# Patient Record
Sex: MALE | Race: WHITE | URBAN - METROPOLITAN AREA
[De-identification: names, ages, dates, MRNs, and addresses within clinical notes are randomized per-mention and may not be internally consistent; named-entity substitution may affect disease eponyms.]

---

## 2021-06-30 ENCOUNTER — EMERGENCY (EMERGENCY)
Facility: HOSPITAL | Age: 36
LOS: 1 days | Discharge: HOME | End: 2021-06-30
Attending: EMERGENCY MEDICINE | Admitting: EMERGENCY MEDICINE
Payer: MEDICAID

## 2021-06-30 VITALS
RESPIRATION RATE: 16 BRPM | HEART RATE: 96 BPM | TEMPERATURE: 98 F | SYSTOLIC BLOOD PRESSURE: 133 MMHG | DIASTOLIC BLOOD PRESSURE: 84 MMHG | HEIGHT: 74 IN | OXYGEN SATURATION: 97 % | WEIGHT: 192.02 LBS

## 2021-06-30 VITALS
OXYGEN SATURATION: 97 % | SYSTOLIC BLOOD PRESSURE: 124 MMHG | DIASTOLIC BLOOD PRESSURE: 86 MMHG | TEMPERATURE: 98 F | HEART RATE: 72 BPM | RESPIRATION RATE: 16 BRPM

## 2021-06-30 DIAGNOSIS — Z88.8 ALLERGY STATUS TO OTHER DRUGS, MEDICAMENTS AND BIOLOGICAL SUBSTANCES: ICD-10-CM

## 2021-06-30 DIAGNOSIS — M54.2 CERVICALGIA: ICD-10-CM

## 2021-06-30 DIAGNOSIS — M21.332 WRIST DROP, LEFT WRIST: ICD-10-CM

## 2021-06-30 DIAGNOSIS — R20.0 ANESTHESIA OF SKIN: ICD-10-CM

## 2021-06-30 DIAGNOSIS — Z86.73 PERSONAL HISTORY OF TRANSIENT ISCHEMIC ATTACK (TIA), AND CEREBRAL INFARCTION WITHOUT RESIDUAL DEFICITS: ICD-10-CM

## 2021-06-30 DIAGNOSIS — R20.2 PARESTHESIA OF SKIN: ICD-10-CM

## 2021-06-30 DIAGNOSIS — F25.9 SCHIZOAFFECTIVE DISORDER, UNSPECIFIED: ICD-10-CM

## 2021-06-30 DIAGNOSIS — R53.1 WEAKNESS: ICD-10-CM

## 2021-06-30 DIAGNOSIS — F31.9 BIPOLAR DISORDER, UNSPECIFIED: ICD-10-CM

## 2021-06-30 DIAGNOSIS — E03.9 HYPOTHYROIDISM, UNSPECIFIED: ICD-10-CM

## 2021-06-30 LAB
ALBUMIN SERPL ELPH-MCNC: 4.8 G/DL — SIGNIFICANT CHANGE UP (ref 3.3–5)
ALP SERPL-CCNC: 92 U/L — SIGNIFICANT CHANGE UP (ref 40–120)
ALT FLD-CCNC: 36 U/L — SIGNIFICANT CHANGE UP (ref 10–45)
ANION GAP SERPL CALC-SCNC: 11 MMOL/L — SIGNIFICANT CHANGE UP (ref 5–17)
APTT BLD: 30.3 SEC — SIGNIFICANT CHANGE UP (ref 27.5–35.5)
AST SERPL-CCNC: 49 U/L — HIGH (ref 10–40)
BASOPHILS # BLD AUTO: 0.06 K/UL — SIGNIFICANT CHANGE UP (ref 0–0.2)
BASOPHILS NFR BLD AUTO: 0.5 % — SIGNIFICANT CHANGE UP (ref 0–2)
BILIRUB SERPL-MCNC: 1.2 MG/DL — SIGNIFICANT CHANGE UP (ref 0.2–1.2)
BUN SERPL-MCNC: 16 MG/DL — SIGNIFICANT CHANGE UP (ref 7–23)
CALCIUM SERPL-MCNC: 9.9 MG/DL — SIGNIFICANT CHANGE UP (ref 8.4–10.5)
CHLORIDE SERPL-SCNC: 99 MMOL/L — SIGNIFICANT CHANGE UP (ref 96–108)
CO2 SERPL-SCNC: 28 MMOL/L — SIGNIFICANT CHANGE UP (ref 22–31)
CREAT SERPL-MCNC: 0.89 MG/DL — SIGNIFICANT CHANGE UP (ref 0.5–1.3)
EOSINOPHIL # BLD AUTO: 0.37 K/UL — SIGNIFICANT CHANGE UP (ref 0–0.5)
EOSINOPHIL NFR BLD AUTO: 3 % — SIGNIFICANT CHANGE UP (ref 0–6)
GLUCOSE SERPL-MCNC: 110 MG/DL — HIGH (ref 70–99)
HCT VFR BLD CALC: 45.8 % — SIGNIFICANT CHANGE UP (ref 39–50)
HGB BLD-MCNC: 15.3 G/DL — SIGNIFICANT CHANGE UP (ref 13–17)
IMM GRANULOCYTES NFR BLD AUTO: 0.3 % — SIGNIFICANT CHANGE UP (ref 0–1.5)
INR BLD: 1.13 — SIGNIFICANT CHANGE UP (ref 0.88–1.16)
LYMPHOCYTES # BLD AUTO: 1.91 K/UL — SIGNIFICANT CHANGE UP (ref 1–3.3)
LYMPHOCYTES # BLD AUTO: 15.5 % — SIGNIFICANT CHANGE UP (ref 13–44)
MCHC RBC-ENTMCNC: 28.4 PG — SIGNIFICANT CHANGE UP (ref 27–34)
MCHC RBC-ENTMCNC: 33.4 GM/DL — SIGNIFICANT CHANGE UP (ref 32–36)
MCV RBC AUTO: 85.1 FL — SIGNIFICANT CHANGE UP (ref 80–100)
MONOCYTES # BLD AUTO: 0.85 K/UL — SIGNIFICANT CHANGE UP (ref 0–0.9)
MONOCYTES NFR BLD AUTO: 6.9 % — SIGNIFICANT CHANGE UP (ref 2–14)
NEUTROPHILS # BLD AUTO: 9.12 K/UL — HIGH (ref 1.8–7.4)
NEUTROPHILS NFR BLD AUTO: 73.8 % — SIGNIFICANT CHANGE UP (ref 43–77)
NRBC # BLD: 0 /100 WBCS — SIGNIFICANT CHANGE UP (ref 0–0)
PLATELET # BLD AUTO: 210 K/UL — SIGNIFICANT CHANGE UP (ref 150–400)
POTASSIUM SERPL-MCNC: 3.9 MMOL/L — SIGNIFICANT CHANGE UP (ref 3.5–5.3)
POTASSIUM SERPL-SCNC: 3.9 MMOL/L — SIGNIFICANT CHANGE UP (ref 3.5–5.3)
PROT SERPL-MCNC: 8.5 G/DL — HIGH (ref 6–8.3)
PROTHROM AB SERPL-ACNC: 13.5 SEC — SIGNIFICANT CHANGE UP (ref 10.6–13.6)
RBC # BLD: 5.38 M/UL — SIGNIFICANT CHANGE UP (ref 4.2–5.8)
RBC # FLD: 12.9 % — SIGNIFICANT CHANGE UP (ref 10.3–14.5)
SODIUM SERPL-SCNC: 138 MMOL/L — SIGNIFICANT CHANGE UP (ref 135–145)
TROPONIN T SERPL-MCNC: <0.01 NG/ML — SIGNIFICANT CHANGE UP (ref 0–0.01)
WBC # BLD: 12.35 K/UL — HIGH (ref 3.8–10.5)
WBC # FLD AUTO: 12.35 K/UL — HIGH (ref 3.8–10.5)

## 2021-06-30 PROCEDURE — 99285 EMERGENCY DEPT VISIT HI MDM: CPT

## 2021-06-30 PROCEDURE — 99285 EMERGENCY DEPT VISIT HI MDM: CPT | Mod: 25

## 2021-06-30 PROCEDURE — 70496 CT ANGIOGRAPHY HEAD: CPT

## 2021-06-30 PROCEDURE — 80053 COMPREHEN METABOLIC PANEL: CPT

## 2021-06-30 PROCEDURE — 70450 CT HEAD/BRAIN W/O DYE: CPT

## 2021-06-30 PROCEDURE — 0042T: CPT

## 2021-06-30 PROCEDURE — 70450 CT HEAD/BRAIN W/O DYE: CPT | Mod: 26,59,MA

## 2021-06-30 PROCEDURE — 93005 ELECTROCARDIOGRAM TRACING: CPT

## 2021-06-30 PROCEDURE — 70498 CT ANGIOGRAPHY NECK: CPT

## 2021-06-30 PROCEDURE — 99254 IP/OBS CNSLTJ NEW/EST MOD 60: CPT

## 2021-06-30 PROCEDURE — G1004: CPT

## 2021-06-30 PROCEDURE — 85025 COMPLETE CBC W/AUTO DIFF WBC: CPT

## 2021-06-30 PROCEDURE — 70496 CT ANGIOGRAPHY HEAD: CPT | Mod: 26,MA

## 2021-06-30 PROCEDURE — 93010 ELECTROCARDIOGRAM REPORT: CPT

## 2021-06-30 PROCEDURE — 85730 THROMBOPLASTIN TIME PARTIAL: CPT

## 2021-06-30 PROCEDURE — 70551 MRI BRAIN STEM W/O DYE: CPT

## 2021-06-30 PROCEDURE — 70551 MRI BRAIN STEM W/O DYE: CPT | Mod: 26,MG

## 2021-06-30 PROCEDURE — 36415 COLL VENOUS BLD VENIPUNCTURE: CPT

## 2021-06-30 PROCEDURE — 84484 ASSAY OF TROPONIN QUANT: CPT

## 2021-06-30 PROCEDURE — 85610 PROTHROMBIN TIME: CPT

## 2021-06-30 PROCEDURE — 70498 CT ANGIOGRAPHY NECK: CPT | Mod: 26,MA

## 2021-06-30 NOTE — ED PROVIDER NOTE - CLINICAL SUMMARY MEDICAL DECISION MAKING FREE TEXT BOX
37 y/o M PMHx stroke. Presents to ED for neck pain, vision changes, left wrist and hand numbness and weakness s/p large amount of cocaine use. Stroke code called secondary to neck pain and left wrist drop. Imaging is negative. Stroke team requests a MRI of brain, which is ordered and pending. 35 y/o M PMHx stroke. Presents to ED for neck pain, vision changes, left wrist and hand numbness and weakness s/p large amount of cocaine use. Stroke code called secondary to neck pain and left wrist drop. Imaging is negative. Stroke team requests a MRI of brain, which is ordered and pending.    MRI negative - discussed with stroke.    Agrees on plan for discharge with wrist splint and ibuprofen as directed for inflammation.

## 2021-06-30 NOTE — STROKE CODE NOTE - PATIENT LAST KNOWN
Ongoing SW/CM Assessment/Plan of Care Note     See SW/CM flowsheets for goals and other objective data.    Patient/Family discharge goal (s):  Goal #1: Communication facilitated  Goal #2: Extended Care Facility discharge arranged       PT Recommendation:  Recommendation for Discharge: PT: Sub-acute nursing home    OT Recommendation:  Recommendations for Discharge: OT: Assisted living, Home therapy, Less intensive rehab    SLP Recommendation:       Disposition:  Planned Discharge Destination: Rehabilitation/Skilled Care    Progress note:   Spoke with Lena at [facility]([phone], update given. Per Lena patient is able to return to AL at discharge and facility is able to assist patient.Facility aware patient is currently assist of one.  Patient was independent prior to admit. Patient lives at [facility] with her . Patients  is not in the assisted living.     Patient was active with [facility] for PT prior to admit. Will need to send resume home care order with patient at discharge. Per Lena if patient not discharged today she will need to come and do an on sight evaluation prior to discharge.  Await discharge by MD.          Known

## 2021-06-30 NOTE — CONSULT NOTE ADULT - ASSESSMENT
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 Stroke code called. Exam significant left wrist weakness and posterior left hand and finger numbness. CTH negative for any acute intracranial injury, CTP shows no perfusion deficits, CTA shows no high grade stenosis or occlusion.     Recommend:    - Q1 hour neuro checks and vital signs in ED   - MR short stroke protocol  - dispo pending results of MR     Case discussed with neurology attending.              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 ED for left hand numbness and wrist weakness after an episode of intense right sided neck pain and "pop" sensation followed by a headache and visual changes. Stroke code called. Exam significant left wrist weakness and posterior left hand and finger numbness. CTH negative for any acute intracranial injury, CTP shows no perfusion deficits, CTA shows no high grade stenosis or occlusion.     Recommend:    - Q1 hour neuro checks and vital signs in ED   - MR short stroke protocol  - MR negative for acute stroke  - symptoms likely 2/2 to radial nerve compression - recommend wrist splint, anti flammatory  - recommend cessation of cocaine use     Case discussed with neurology attending.

## 2021-06-30 NOTE — ED ADULT NURSE NOTE - MODE OF DISCHARGE

## 2021-06-30 NOTE — ED PROVIDER NOTE - OBJECTIVE STATEMENT
35 y/o M PMHx stroke at 27. Presents to ED s/p large amount of cocaine use at 4am. Pt woke up at 5am with severe neck pain, vision changes, left wrist and hand numbness and weakness. Denies having deficits from his first stroke. In ED, headache and neck pain have resolved however left hand weakness and numbness persists. Stroke called upon arrival.

## 2021-06-30 NOTE — ED PROVIDER NOTE - CONSTITUTIONAL, MLM
normal... Unkept appearing, awake, alert, oriented to person, place, time/situation and in no apparent distress.

## 2021-06-30 NOTE — CONSULT NOTE ADULT - ATTENDING COMMENTS
36 yr with h/o ?stroke from cocaine use many yrs ago(he states he did not go to the hospital for evaluation at that time-but was told several months later that it could have been a stroke); presented with left UE distal weakness in the extensors. MRI brain-no stroke. CTA h/n-no stenosis/LVO.     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

## 2021-06-30 NOTE — ED PROVIDER NOTE - NEUROLOGICAL, MLM
Alert and oriented, no focal deficits. + for left wrist drop and decreased sensation to the left hand. Left arm has no drift. 5/5 strength.

## 2021-06-30 NOTE — ED PROVIDER NOTE - NSFOLLOWUPINSTRUCTIONS_ED_ALL_ED_FT
Wear left wrist splint for comfort.    Take ibuprofen as directed for inflammation.          Radial Nerve Palsy    WHAT YOU NEED TO KNOW:    What is radial nerve palsy? Radial nerve palsy is a condition that affects the radial nerve. The radial nerve starts in your upper arm and runs down to your wrist and fingers. It controls how your arm and hand move and feel. This condition may go away over time or you may always have it.    What causes radial nerve palsy?   •Pressure: ?Devices: Devices that press on the radial nerve may cause damage. For example, crutches can put pressure on the nerve in the armpit. A watch can put pressure on the nerve at the wrist.      ?Awkward body positions: Awkward body positions can also put pressure on the nerve. For example, you sleep on your arm or leave it draped over the back of a chair for several hours.      ?Bruises: A bruise that is caused by an injury may put pressure on the radial nerve.      ?Growths: Tumors or cysts (lumps) inside your wrist or arm may press against the nerve.      •Fractures (breaks) or dislocations: If you fracture the bone in your upper arm, it may damage the radial nerve. Dislocations happen when joints (where 2 bones meet) in the shoulder or elbow slip out of place. This can also damage the radial nerve.      •Cuts on your wrist or arm: Cuts can damage or separate the radial nerve.      What are the signs and symptoms of radial nerve palsy? Your signs and symptoms depend on where the nerve is damaged.   •Weakness or numbness: You may have weakness or numbness from your triceps down to your fingers. You may not be able to make a fist. You may not be able to straighten your elbow, or extend your fingers. You may begin to lose muscle in your upper or lower arm and it may look smaller.      •Wrist drop: This is when your wrist hangs down limply and you cannot lift it.      •Pinch and grasp problems: You may not be able to bring your thumb and fingers together (pinch) to grasp objects.      How is radial nerve palsy diagnosed? Your healthcare provider will ask you questions about your symptoms and when they began. He may touch and move your arm and hand to see which muscles are affected. You may also have any of the following:   •X-rays: You may need x-rays to check for broken bones or foreign objects in your arm.      •Magnetic resonance imaging: This test is called an MRI. An MRI may be used to look at the soft tissues and blood vessels in your arm, and to check for cysts or masses. You will need to lie still during this test. The MRI machine contains a very powerful magnet. Never enter the MRI room with any metal objects. This can cause serious injury. Tell your healthcare provider if you have any metal implants in your body.      •Electromyography and nerve conduction studies: Electromyography (EMG) and nerve conduction studies (NCSs) are tests that measure the electrical activity of your muscles. Your muscles are tested at rest and while you are using them. An EMG test may also check the nerves that control your muscles. NCSs check if your radial nerve is working more slowly than normal.      How is radial nerve palsy treated? Recovery time depends on how badly the nerve was damaged. It may take weeks to months for a nerve to heal. In some cases, it may take up to a year to regain normal movement and feeling in your arm or hand. You may need these or other treatments:   •Pain medicine: You may be given medicine to take away or decrease pain. Do not wait until the pain is severe before you take your medicine.      •Splint or cast: You may need a splint or cast to help support your wrist and hand while the radial nerve heals. A splint or cast helps extend your fingers and wrist so you can use them as much as possible.      •Physical therapy: Physical therapy helps you with special exercises. These exercises help make your bones and muscles strong and flexible.      •Transcutaneous electrical nerve stimulation: This therapy applies a gentle electric current to your muscles and may help reduce your pain.      •Surgery: You may need surgery to repair the radial nerve. Ask your healthcare provider for more information about radial nerve surgery.      What are the risks of radial nerve palsy? Weakness, numbness, or pain in your arm or hand may be worse with treatment. You may also have long-term swelling, burning, and changes to the color of the skin on your injured arm. Without treatment, you may lose all movement and feeling in your arm or hand. You may have long-term pain. The muscles in your hand may start to tighten and shorten. This may cause your hand to curl into a fist and you may not be able to use it.    When should I contact my healthcare provider?   •You have a fever.      •You see redness, swelling, or pus around your wound.      •You have questions or concerns about your condition.      When should I seek immediate care?   •Your splint or cast is too tight or causes pain. The skin around your splint stings or burns.      •Weakness and numbness in your arm and hand are worse after treatment.      •Pain is worse after treatment, with swelling, burning, and changes in the color of the skin on the injured arm.      CARE AGREEMENT:    You have the right to help plan your care. Learn about your health condition and how it may be treated. Discuss treatment options with your healthcare providers to decide what care you want to receive. You always have the right to refuse treatment.        © Copyright [employer] 2021           back to top                          © Copyright [employer] 2021

## 2021-06-30 NOTE — ED PROVIDER NOTE - PATIENT PORTAL LINK FT
You can access the FollowMyHealth Patient Portal offered by [facility] by registering at the following website: [email or web address]. By joining [employer]’s FollowMyHealth portal, you will also be able to view your health information using other applications (apps) compatible with our system.

## 2021-06-30 NOTE — ED ADULT TRIAGE NOTE - CHIEF COMPLAINT QUOTE
pt did cocaine at 4am, at 5am woke up felt a pop in his head on the right side, saw bright lights & his left hand went numb. c/o of numbness to left hand with drift.

## 2021-06-30 NOTE — CONSULT NOTE ADULT - SUBJECTIVE AND OBJECTIVE BOX
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 Patient reports "I smoked coke last night then tried to take a nap around 4: am at 5:00 am I woke up to a pain in the right side of my neck and felt a "pop" sensation. Then I had a really bad headache and saw a bright light, then my right eye was blurry for a few minutes. After that I noticed that my left hand was weak." Patient reports the vision changes , neck pain and head ache have now resolved. Patient denies any other drug use today. Patient denies chest pain, SOB, difficulty walking, changes in his voice. Patient vaccinated full for COVID.      Stroke code called in ED.     T(C): 36.6 (06-30-21 @ 13:13), Max: 36.6 (06-30-21 @ 10:41)  HR: 68 (06-30-21 @ 13:13) (68 - 96)  BP: 107/68 (06-30-21 @ 13:13) (107/68 - 133/84)  RR: 16 (06-30-21 @ 13:13) (16 - 16)  SpO2: 98% (06-30-21 @ 13:13) (97% - 98%)    PAST MEDICAL & SURGICAL HISTORY:      FAMILY HISTORY:      SOCIAL HISTORY:    ROS: ***  Constitutional: No fever, weight loss or fatigue  Eyes: No eye pain, visual disturbances, or discharge  ENMT:  No difficulty hearing, tinnitus, vertigo; No sinus or throat pain  Neck: No pain or stiffness  Respiratory: No cough, wheezing, chills or hemoptysis  Cardiovascular: No chest pain, palpitations, shortness of breath, dizziness or leg swelling  Gastrointestinal: No abdominal pain. No nausea, vomiting or hematemesis; No diarrhea or constipation. Nohematochezia.  Genitourinary: No dysuria, frequency, hematuria or incontinence  Neurological: As per HPI  Skin: No itching, burning, rashes or lesions   Endocrine: No heat or cold intolerance; No hair loss  Musculoskeletal: No joint pain or swelling; No muscle, back or extremity pain  Psychiatric: No depression, anxiety, mood swings or difficulty sleeping  Heme/Lymph: No easy bruising or bleeding gums    MEDICATIONS  (STANDING):    MEDICATIONS  (PRN):    Allergies    Abilify (Unknown)  Thorazine (Unknown)    Intolerances      Vital Signs Last 24 Hrs  T(C): 36.6 (30 Jun 2021 13:13), Max: 36.6 (30 Jun 2021 10:41)  T(F): 97.9 (30 Jun 2021 13:13), Max: 97.9 (30 Jun 2021 13:13)  HR: 68 (30 Jun 2021 13:13) (68 - 96)  BP: 107/68 (30 Jun 2021 13:13) (107/68 - 133/84)  BP(mean): --  RR: 16 (30 Jun 2021 13:13) (16 - 16)  SpO2: 98% (30 Jun 2021 13:13) (97% - 98%)    Physical exam:  Constitutional: No acute distress, conversant  Eyes: Anicteric sclerae, moist conjunctivae, see below for CNs  Neck: trachea midline, FROM, supple, no thyromegaly or lymphadenopathy  Cardiovascular: Regular rate and rhythm, no murmurs, rubs, or gallops. No carotid bruits.   Pulmonary: Anterior breath sounds clear bilaterally, no crackles or wheezing. No use of accessory muscles  GI: Abdomen soft, non-distended, non-tender  Extremities: Radial and DP pulses +2, no edema    Neurologic:  -Mental status: Awake, alert, oriented to person, place, and time. Speech is fluent with intact naming, repetition, and comprehension, no dysarthria. Recent and remote memory intact. Follows commands. Attention/concentration intact. Fund of knowledge appropriate.  -Cranial nerves:   II: Visual fields are full to confrontation.  III, IV, VI: Extraocular movements are intact without nystagmus. Pupils equally round and reactive to light  V:  Facial sensation V1-V3 equal and intact   VII: Face is symmetric with normal eye closure and smile  VIII: Hearing is bilaterally intact to finger rub  Motor: Normal bulk and tone. No pronator drift, needed to use right hand to turn his left palm upwards. Left upper arm 5/5, left wrist drop, decreased  strength, right arm 5/5 bilateral lower extremities 5/5.  Sensation: Numbness to posterior left hand and fingers.  Coordination: No dysmetria on finger-to-nose and heel-to-shin bilaterally  Reflexes: Downgoing toes bilaterally   Gait: Narrow gait and steady    NIHSS: 1    Fingerstick Blood Glucose: CAPILLARY BLOOD GLUCOSE        LABS:                        15.3   12.35 )-----------( 210      ( 30 Jun 2021 11:07 )             45.8     06-30    138  |  99  |  16  ----------------------------<  110<H>  3.9   |  28  |  0.89    Ca    9.9      30 Jun 2021 11:07    TPro  8.5<H>  /  Alb  4.8  /  TBili  1.2  /  DBili  x   /  AST  49<H>  /  ALT  36  /  AlkPhos  92  06-30    PT/INR - ( 30 Jun 2021 11:07 )   PT: 13.5 sec;   INR: 1.13          PTT - ( 30 Jun 2021 11:07 )  PTT:30.3 sec  CARDIAC MARKERS ( 30 Jun 2021 11:07 )  x     / <0.01 ng/mL / x     / x     / x          RADIOLOGY & ADDITIONAL STUDIES:    CT: No acute intracranial hemorrhage, transcortical infarct, or mass effect.  CTA:No large vessel occlusion or high-grade stenosis.  CTP: No reported abnormal perfusion metrics.    -----------------------------------------------------------------------------------------------------------------  IV-tPA (Y/N):    no                              Bolus time:  Reason IV-tPA not given: out of window

## 2021-06-30 NOTE — ED ADULT NURSE NOTE - NSIMPLEMENTINTERV_GEN_ALL_ED
Implemented All Fall with Harm Risk Interventions:  [place] to call system. Call bell, personal items and telephone within reach. Instruct patient to call for assistance. Room bathroom lighting operational. Non-slip footwear when patient is off stretcher. Physically safe environment: no spills, clutter or unnecessary equipment. Stretcher in lowest position, wheels locked, appropriate side rails in place. Provide visual cue, wrist band, yellow gown, etc. Monitor gait and stability. Monitor for mental status changes and reorient to person, place, and time. Review medications for side effects contributing to fall risk. Reinforce activity limits and safety measures with patient and family. Provide visual clues: red socks.

## 2021-07-21 NOTE — ED ADULT NURSE NOTE - CHPI ED NUR SYMPTOMS POS
done
+stroke notification on arrival for L hand weakness/numbness - see NIHSS stroke scale in flow sheets. pt following commands appropriately

## 2022-02-27 ENCOUNTER — EMERGENCY (EMERGENCY)
Facility: HOSPITAL | Age: 37
LOS: 1 days | Discharge: HOME | End: 2022-02-27
Attending: EMERGENCY MEDICINE | Admitting: EMERGENCY MEDICINE
Payer: SELF-PAY

## 2022-02-27 VITALS
WEIGHT: 184.09 LBS | HEIGHT: 74 IN | RESPIRATION RATE: 17 BRPM | HEART RATE: 81 BPM | TEMPERATURE: 98 F | OXYGEN SATURATION: 98 % | DIASTOLIC BLOOD PRESSURE: 61 MMHG | SYSTOLIC BLOOD PRESSURE: 131 MMHG

## 2022-02-27 PROCEDURE — 99284 EMERGENCY DEPT VISIT MOD MDM: CPT

## 2022-02-27 PROCEDURE — 99053 MED SERV 10PM-8AM 24 HR FAC: CPT

## 2022-02-27 NOTE — ED PROVIDER NOTE - CLINICAL SUMMARY MEDICAL DECISION MAKING FREE TEXT BOX
37 yo disheveled M with PMH of seizures p/w "I have been having seizures for the past four years. I was on Depakote but I have not been on medication for about a month. I have about 2 seizures a day." Pt is vague about hi seizures and is unable to tell me what type of seizures he gets and is asking for medications and an "MRI". States "I passed out" when asked about his seizure and cannot specify when he had the last seizure. States that he is followed by an ACT team but denies any mental health issues. Denies SI/HI/ hallucinations. Is insisting on an MRI as part of the seizure "workup" and states "I am a doctor you have to give me an MRI". No other complaints.  ED course: VS noted. Pt afebrile and rest of VS WNL. FSBG WNL. Pt advised that he does not require an MRI in the ED today and he needs to f/up outpt with his neurologist for medication management for his seizure disorder. Non-toxic appearing and stable for discharge. To follow up outpatient. Strict return precautions given. 39 yo disheveled M with PMH of seizures p/w "I have been having seizures for the past four years. I was on Depakote but I have not been on medication for about a month. I have about 2 seizures a day." Pt is vague about hi seizures and is unable to tell me what type of seizures he gets and is asking for medications and an "MRI". States "I passed out" when asked about his seizure and cannot specify when he had the last seizure. States that he is followed by an ACT team but denies any mental health issues. Denies SI/HI/ hallucinations. Is insisting on an MRI as part of the seizure "workup" and states "I am a doctor you have to give me an MRI". No other complaints.  ED course: VS noted. Pt afebrile and rest of VS WNL. FSBG 161. Pt advised that he does not require an MRI in the ED today and he needs to f/up outpt with his neurologist for medication management for his seizure disorder. Non-toxic appearing and stable for discharge. To follow up outpatient. Strict return precautions given.

## 2022-02-27 NOTE — ED PROVIDER NOTE - PATIENT PORTAL LINK FT
You can access the FollowMyHealth Patient Portal offered by [facility] by registering at the following website: [email or web address]. By joining [employer]’s FollowMyHealth portal, you will also be able to view your health information using other applications (apps) compatible with our system.

## 2022-02-27 NOTE — ED PROVIDER NOTE - NSFOLLOWUPCLINICS_GEN_ALL_ED_FT
[facility]  Family Medicine  [address]  [place], [place] [address]  Phone: [phone]  Fax:   Follow Up Time: 4-6 Days

## 2022-02-27 NOTE — ED ADULT TRIAGE NOTE - CHIEF COMPLAINT QUOTE
"I have been having seizures for the past four years. I was on Depakote but I have not been on medication for about a month. I have about 2 seizures a day."

## 2022-02-27 NOTE — ED PROVIDER NOTE - CONSTITUTIONAL, MLM
normal... Disheveled, unkempt, awake, alert, oriented and in no apparent distress. No head injury noted.

## 2022-02-27 NOTE — ED PROVIDER NOTE - OBJECTIVE STATEMENT
37 yo disheveled M with PMH of seizures p/w "I have been having seizures for the past four years. I was on Depakote but I have not been on medication for about a month. I have about 2 seizures a day." Pt is vague about hi seizures and is unable to tell me what type of seizures he gets and is asking for medications and an "MRI". States "I passed out" when asked about his seizure and cannot specify when he had the last seizure. States that he is followed by an ACT team but denies any mental health issues. Denies SI/HI/ hallucinations. Is insisting on an MRI as part of the seizure "workup" and states "I am a doctor you have to give me an MRI". No other complaints.

## 2022-02-27 NOTE — ED PROVIDER NOTE - NSFOLLOWUPINSTRUCTIONS_ED_ALL_ED_FT
Please follow up with your neurologist as soon as possible for medication refills.     Seizure    A seizure is abnormal electrical activity in the brain; the specific cause may or may not be found. Prior to a seizure you may experience a warning sensation (aura) that may include fear, nausea, dizziness, and visual changes such as flashing lights of spots. Common symptoms during the seizure may include an altered mental status, rhythmic jerking movements, drooling, grunting, loss of bladder or bowel control, or tongue biting. After a seizure, you may feel confused and sleepy.     Do not swim, drive, operate machinery, or engage in any risky activity during which a seizure could cause further injury to you or others. Teach friends and family what to do if you HAVE a seizure which includes laying you on the ground with your head on a cushion and turning you to the side to keep your breathing passages clear in case of vomiting.    SEEK IMMEDIATE MEDICAL CARE IF YOU HAVE ANY OF THE FOLLOWING SYMPTOMS: seizure lasting over 5 minutes, not waking up or persistent altered mental status after the seizure, or more frequent or worsening seizures.

## 2022-02-27 NOTE — ED ADULT NURSE NOTE - OBJECTIVE STATEMENT
Pt states he has been out of his seizure medicine for 1 month and having 2 seizures a day. Pt a+ox4.

## 2022-03-01 DIAGNOSIS — G40.909 EPILEPSY, UNSPECIFIED, NOT INTRACTABLE, WITHOUT STATUS EPILEPTICUS: ICD-10-CM

## 2022-03-01 DIAGNOSIS — Z88.8 ALLERGY STATUS TO OTHER DRUGS, MEDICAMENTS AND BIOLOGICAL SUBSTANCES: ICD-10-CM

## 2022-03-01 DIAGNOSIS — R56.9 UNSPECIFIED CONVULSIONS: ICD-10-CM

## 2022-03-28 ENCOUNTER — EMERGENCY (EMERGENCY)
Facility: HOSPITAL | Age: 37
LOS: 1 days | Discharge: LEFT BEFORE DISCHARGE | End: 2022-03-28
Admitting: EMERGENCY MEDICINE
Payer: SELF-PAY

## 2022-03-28 VITALS
DIASTOLIC BLOOD PRESSURE: 81 MMHG | WEIGHT: 184.97 LBS | HEART RATE: 89 BPM | TEMPERATURE: 99 F | SYSTOLIC BLOOD PRESSURE: 123 MMHG | OXYGEN SATURATION: 100 % | RESPIRATION RATE: 19 BRPM | HEIGHT: 74 IN

## 2022-03-28 PROCEDURE — 99283 EMERGENCY DEPT VISIT LOW MDM: CPT

## 2022-03-28 PROCEDURE — 99053 MED SERV 10PM-8AM 24 HR FAC: CPT

## 2022-03-28 NOTE — ED PROVIDER NOTE - PATIENT PORTAL LINK FT
You can access the FollowMyHealth Patient Portal offered by [facility] by registering at the following website: [email or web address]. By joining [employer]’s FollowMyHealth portal, you will also be able to view your health information using other applications (apps) compatible with our system.

## 2022-03-28 NOTE — ED PROVIDER NOTE - OBJECTIVE STATEMENT
The pt is a 37 y/o undomicile M, who presents to ED stating "my diabetes be acting up, I want head xrays". States that has not been taking his meds, occasional h/a. Denies n/v/d, polyuria, polydipsia, abd pain, fevers, falls, dizziness.

## 2022-03-28 NOTE — ED ADULT TRIAGE NOTE - OTHER COMPLAINTS
CC of hyperglycemia, as per pt he is diabetes and not taking his meds for 2 weeks. also complains with itchiness. non-domicile.

## 2022-03-28 NOTE — ED PROVIDER NOTE - ENMT, MLM
Airway patent, Nasal mucosa clear. Mouth with poor dentition, multiple missing teeth. Throat has no vesicles, no oropharyngeal exudates and uvula is midline.

## 2022-03-28 NOTE — ED PROVIDER NOTE - CLINICAL SUMMARY MEDICAL DECISION MAKING FREE TEXT BOX
undomicile m presents c/o sugar being high - admits to non compliance w/meds, also requesting xrays of head due to occasional ha - no symptoms at this time, non focal exam, fs in 130s, when advised to take meds and f/u w/clinic and told that no imaging indicated at this time and needs to f/u w/clinic - cursing and yelling, eloped from dept

## 2022-03-29 ENCOUNTER — EMERGENCY (EMERGENCY)
Facility: HOSPITAL | Age: 37
LOS: 1 days | Discharge: HOME | End: 2022-03-29
Admitting: EMERGENCY MEDICINE
Payer: SELF-PAY

## 2022-03-29 VITALS
HEART RATE: 70 BPM | WEIGHT: 188.94 LBS | OXYGEN SATURATION: 97 % | SYSTOLIC BLOOD PRESSURE: 111 MMHG | RESPIRATION RATE: 16 BRPM | DIASTOLIC BLOOD PRESSURE: 66 MMHG | HEIGHT: 74 IN | TEMPERATURE: 97 F

## 2022-03-29 LAB
ALBUMIN SERPL ELPH-MCNC: 3.5 G/DL — SIGNIFICANT CHANGE UP (ref 3.4–5)
ALP SERPL-CCNC: 126 U/L — HIGH (ref 40–120)
ALT FLD-CCNC: 492 U/L — HIGH (ref 12–42)
ANION GAP SERPL CALC-SCNC: 7 MMOL/L — LOW (ref 9–16)
APPEARANCE UR: CLEAR — SIGNIFICANT CHANGE UP
AST SERPL-CCNC: 175 U/L — HIGH (ref 15–37)
BASOPHILS # BLD AUTO: 0 K/UL — SIGNIFICANT CHANGE UP (ref 0–0.2)
BASOPHILS NFR BLD AUTO: 0 % — SIGNIFICANT CHANGE UP (ref 0–2)
BILIRUB SERPL-MCNC: 0.4 MG/DL — SIGNIFICANT CHANGE UP (ref 0.2–1.2)
BILIRUB UR-MCNC: NEGATIVE — SIGNIFICANT CHANGE UP
BUN SERPL-MCNC: 12 MG/DL — SIGNIFICANT CHANGE UP (ref 7–23)
CALCIUM SERPL-MCNC: 8.8 MG/DL — SIGNIFICANT CHANGE UP (ref 8.5–10.5)
CHLORIDE SERPL-SCNC: 105 MMOL/L — SIGNIFICANT CHANGE UP (ref 96–108)
CO2 SERPL-SCNC: 28 MMOL/L — SIGNIFICANT CHANGE UP (ref 22–31)
COLOR SPEC: YELLOW — SIGNIFICANT CHANGE UP
CREAT SERPL-MCNC: 0.71 MG/DL — SIGNIFICANT CHANGE UP (ref 0.5–1.3)
DIFF PNL FLD: NEGATIVE — SIGNIFICANT CHANGE UP
EGFR: 123 ML/MIN/1.73M2 — SIGNIFICANT CHANGE UP
EOSINOPHIL # BLD AUTO: 0.3 K/UL — SIGNIFICANT CHANGE UP (ref 0–0.5)
EOSINOPHIL NFR BLD AUTO: 7 % — HIGH (ref 0–6)
GLUCOSE SERPL-MCNC: 83 MG/DL — SIGNIFICANT CHANGE UP (ref 70–99)
GLUCOSE UR QL: NEGATIVE — SIGNIFICANT CHANGE UP
HCT VFR BLD CALC: 40.4 % — SIGNIFICANT CHANGE UP (ref 39–50)
HGB BLD-MCNC: 13.2 G/DL — SIGNIFICANT CHANGE UP (ref 13–17)
KETONES UR-MCNC: NEGATIVE — SIGNIFICANT CHANGE UP
LACTATE SERPL-SCNC: 0.6 MMOL/L — SIGNIFICANT CHANGE UP (ref 0.4–2)
LEUKOCYTE ESTERASE UR-ACNC: NEGATIVE — SIGNIFICANT CHANGE UP
LYMPHOCYTES # BLD AUTO: 1.61 K/UL — SIGNIFICANT CHANGE UP (ref 1–3.3)
LYMPHOCYTES # BLD AUTO: 37 % — SIGNIFICANT CHANGE UP (ref 13–44)
MAGNESIUM SERPL-MCNC: 1.7 MG/DL — SIGNIFICANT CHANGE UP (ref 1.6–2.6)
MANUAL SMEAR VERIFICATION: SIGNIFICANT CHANGE UP
MCHC RBC-ENTMCNC: 28.5 PG — SIGNIFICANT CHANGE UP (ref 27–34)
MCHC RBC-ENTMCNC: 32.7 GM/DL — SIGNIFICANT CHANGE UP (ref 32–36)
MCV RBC AUTO: 87.3 FL — SIGNIFICANT CHANGE UP (ref 80–100)
MONOCYTES # BLD AUTO: 0.48 K/UL — SIGNIFICANT CHANGE UP (ref 0–0.9)
MONOCYTES NFR BLD AUTO: 11 % — SIGNIFICANT CHANGE UP (ref 2–14)
NEUTROPHILS # BLD AUTO: 1.91 K/UL — SIGNIFICANT CHANGE UP (ref 1.8–7.4)
NEUTROPHILS NFR BLD AUTO: 43 % — SIGNIFICANT CHANGE UP (ref 43–77)
NEUTS BAND # BLD: 1 % — SIGNIFICANT CHANGE UP (ref 0–8)
NITRITE UR-MCNC: NEGATIVE — SIGNIFICANT CHANGE UP
NRBC # BLD: 0 /100 — SIGNIFICANT CHANGE UP (ref 0–0)
NRBC # BLD: SIGNIFICANT CHANGE UP /100 WBCS (ref 0–0)
PH UR: 6 — SIGNIFICANT CHANGE UP (ref 5–8)
PLAT MORPH BLD: NORMAL — SIGNIFICANT CHANGE UP
PLATELET # BLD AUTO: 171 K/UL — SIGNIFICANT CHANGE UP (ref 150–400)
POTASSIUM SERPL-MCNC: 3.5 MMOL/L — SIGNIFICANT CHANGE UP (ref 3.5–5.3)
POTASSIUM SERPL-SCNC: 3.5 MMOL/L — SIGNIFICANT CHANGE UP (ref 3.5–5.3)
PROT SERPL-MCNC: 7 G/DL — SIGNIFICANT CHANGE UP (ref 6.4–8.2)
PROT UR-MCNC: NEGATIVE MG/DL — SIGNIFICANT CHANGE UP
RBC # BLD: 4.63 M/UL — SIGNIFICANT CHANGE UP (ref 4.2–5.8)
RBC # FLD: 13.8 % — SIGNIFICANT CHANGE UP (ref 10.3–14.5)
RBC BLD AUTO: NORMAL — SIGNIFICANT CHANGE UP
SODIUM SERPL-SCNC: 140 MMOL/L — SIGNIFICANT CHANGE UP (ref 132–145)
SP GR SPEC: <=1.005 — SIGNIFICANT CHANGE UP (ref 1–1.03)
UROBILINOGEN FLD QL: 0.2 E.U./DL — SIGNIFICANT CHANGE UP
VARIANT LYMPHS # BLD: 1 % — SIGNIFICANT CHANGE UP (ref 0–6)
WBC # BLD: 4.34 K/UL — SIGNIFICANT CHANGE UP (ref 3.8–10.5)
WBC # FLD AUTO: 4.34 K/UL — SIGNIFICANT CHANGE UP (ref 3.8–10.5)

## 2022-03-29 PROCEDURE — 99053 MED SERV 10PM-8AM 24 HR FAC: CPT

## 2022-03-29 PROCEDURE — 93010 ELECTROCARDIOGRAM REPORT: CPT

## 2022-03-29 PROCEDURE — 72125 CT NECK SPINE W/O DYE: CPT | Mod: 26

## 2022-03-29 PROCEDURE — 99285 EMERGENCY DEPT VISIT HI MDM: CPT

## 2022-03-29 PROCEDURE — 70450 CT HEAD/BRAIN W/O DYE: CPT | Mod: 26

## 2022-03-29 RX ORDER — OXCARBAZEPINE 300 MG/1
1 TABLET, FILM COATED ORAL
Qty: 30 | Refills: 0
Start: 2022-03-29 | End: 2022-04-27

## 2022-03-29 RX ORDER — OXCARBAZEPINE 300 MG/1
300 TABLET, FILM COATED ORAL ONCE
Refills: 0 | Status: COMPLETED | OUTPATIENT
Start: 2022-03-29 | End: 2022-03-29

## 2022-03-29 RX ADMIN — OXCARBAZEPINE 300 MILLIGRAM(S): 300 TABLET, FILM COATED ORAL at 09:03

## 2022-03-29 NOTE — ED PROVIDER NOTE - OBJECTIVE STATEMENT
36 y/o M with PMH of seizure, noncompliant with Trileptal, presents to ED via walk in s/p reported seizure and fall.  Pt states he woke up on the kitchen floor and c/o posterior head pain.  His last seizure was 1 week ago.  He ran out of his medications and has not taken them for approx 3 months.  The triage note states he has not taken meds in 2 weeks.  He does not have a neurologist to f/u with.  Pt also c/o pain in his spine by his "cerebral cortex" while pointing to his cervical spine.  Pt has allergies to Haldol.  He states used to get Haldol for anxiety.

## 2022-03-29 NOTE — ED PROVIDER NOTE - PATIENT PORTAL LINK FT
You can access the FollowMyHealth Patient Portal offered by [facility] by registering at the following website: [email or web address]. By joining [employer]’s FollowMyHealth portal, you will also be able to view your health information using other applications (apps) compatible with our system.

## 2022-03-29 NOTE — ED ADULT NURSE NOTE - NSIMPLEMENTINTERV_GEN_ALL_ED
Implemented All Fall Risk Interventions:  [place] to call system. Call bell, personal items and telephone within reach. Instruct patient to call for assistance. Room bathroom lighting operational. Non-slip footwear when patient is off stretcher. Physically safe environment: no spills, clutter or unnecessary equipment. Stretcher in lowest position, wheels locked, appropriate side rails in place. Provide visual cue, wrist band, yellow gown, etc. Monitor gait and stability. Monitor for mental status changes and reorient to person, place, and time. Review medications for side effects contributing to fall risk. Reinforce activity limits and safety measures with patient and family.

## 2022-03-29 NOTE — ED PROVIDER NOTE - PHYSICAL EXAMINATION
Constitutional:  Well appearing, awake, alert, oriented to person, place, time/situation and in no apparent distress.  He is wearing multiple layers of clothing  Head:  NC/AT, symmetric, neck supple  ENMT: Airway patent, nasal mucosa clear, mouth with normal mucosa, throat has no vesicles, no oropharyngeal exudates and uvula is midline  Eyes:  Clear bilaterally, pupils equal, round and reactive to light  Cardiac:  Normal rate, regular rhythm. Heart sounds S1,S2.  No murmurs, rubs or gallops.  No JVD.  Respiratory:  Breath sounds clear and equal bilaterally  GI:   Abd soft, non-distended, non-tender, no guarding  MSK:  Spine appears normal, range of motion is not limited, no muscle or joint tenderness.  no midline spinal ttp  Neuro:  Alert and oriented, no focal deficits, no motor or sensory deficits  Skin:  Skin normal color for race, warm, dry and intact.  No evidence of rash  Psych:  Normal mood and affect, no apparent risk to self or others.

## 2022-03-29 NOTE — ED PROVIDER NOTE - NS ED ROS FT
Constitutional:  no fever, no chills  Eyes:  no discharge, no irritations, no pain, no redness, visual changes  Ears:  no ear pain, no ear drainage,  no hearing problems  Nose:  no nasal congestion, no nasal drainage  Mouth/Throat:  no hoarseness and no throat pain  Neck:  no stiffness, no pain, no lumps, no swollen glands  Cardiac:  no chest pain, no edema  Respiratory:  no cough, no shortness of breath  GI: no abdominal pain, no bloating, no constipation, no diarrhea, no nausea, no vomiting  :  no dysuria, no urinary frequency, no hematuria, no discharge  MSK:  no back pain, no msk pain, no weakness, neck pain  NEURO: + headache, no weakness, no numbness  Skin:  no lesions, no pruritis, no jaundice, no bruising, no rash  Psych:  no known mental health issues  Endocrine:  no diabetes, no thyroid issues

## 2022-03-29 NOTE — ED PROVIDER NOTE - CLINICAL SUMMARY MEDICAL DECISION MAKING FREE TEXT BOX
38 y/o M presents to ED s/p reported seizure at home.  Pt well appearing, VSS, NAD.  No tongue biting or tongue bite markings.  Labs wnl, ekg wnl.  CT head and cervical spine.

## 2022-03-29 NOTE — ED ADULT TRIAGE NOTE - CHIEF COMPLAINT QUOTE
Pt. walk stating that he had a seizure 4 hours ago while cooking. Reports pain to posterior head and upper back. States that he has nit taken his trileptal for about 2 weeks. Pt. walk stating that he had a seizure 4 hours ago while cooking. Reports waking up on the floor and pain to posterior head and upper back. States that he has not taken his trileptal for about 2 weeks.

## 2022-03-29 NOTE — ED ADULT NURSE NOTE - OBJECTIVE STATEMENT
Pt presents to ED postictal- oriented x2. Pt is known epileptic non compliant with meds for 2 weeks. Reports this is first seizure since going off meds. Endorses falling to the ground + headstrike. Complains of frontal and posterior headache, with pain to the c spine as well. Denies visual disturbances no neuro defecits. Seizure precautions initiated.

## 2022-03-30 DIAGNOSIS — Z88.8 ALLERGY STATUS TO OTHER DRUGS, MEDICAMENTS AND BIOLOGICAL SUBSTANCES: ICD-10-CM

## 2022-03-30 DIAGNOSIS — R56.9 UNSPECIFIED CONVULSIONS: ICD-10-CM

## 2022-03-31 DIAGNOSIS — E11.65 TYPE 2 DIABETES MELLITUS WITH HYPERGLYCEMIA: ICD-10-CM

## 2022-03-31 DIAGNOSIS — Z59.00 HOMELESSNESS UNSPECIFIED: ICD-10-CM

## 2022-03-31 DIAGNOSIS — Z88.8 ALLERGY STATUS TO OTHER DRUGS, MEDICAMENTS AND BIOLOGICAL SUBSTANCES: ICD-10-CM

## 2022-03-31 DIAGNOSIS — R51.9 HEADACHE, UNSPECIFIED: ICD-10-CM

## 2022-03-31 DIAGNOSIS — R46.0 VERY LOW LEVEL OF PERSONAL HYGIENE: ICD-10-CM

## 2022-03-31 DIAGNOSIS — Z76.5 MALINGERER [CONSCIOUS SIMULATION]: ICD-10-CM

## 2022-03-31 SDOH — ECONOMIC STABILITY - HOUSING INSECURITY: HOMELESSNESS UNSPECIFIED: Z59.00

## 2022-04-28 ENCOUNTER — EMERGENCY (EMERGENCY)
Facility: HOSPITAL | Age: 37
LOS: 1 days | Discharge: HOME | End: 2022-04-28
Admitting: EMERGENCY MEDICINE
Payer: SELF-PAY

## 2022-04-28 VITALS
WEIGHT: 169.98 LBS | TEMPERATURE: 98 F | HEART RATE: 85 BPM | RESPIRATION RATE: 18 BRPM | OXYGEN SATURATION: 99 % | HEIGHT: 74 IN | SYSTOLIC BLOOD PRESSURE: 125 MMHG | DIASTOLIC BLOOD PRESSURE: 75 MMHG

## 2022-04-28 DIAGNOSIS — Z00.00 ENCOUNTER FOR GENERAL ADULT MEDICAL EXAMINATION WITHOUT ABNORMAL FINDINGS: ICD-10-CM

## 2022-04-28 DIAGNOSIS — Z88.8 ALLERGY STATUS TO OTHER DRUGS, MEDICAMENTS AND BIOLOGICAL SUBSTANCES: ICD-10-CM

## 2022-04-28 PROCEDURE — 99284 EMERGENCY DEPT VISIT MOD MDM: CPT

## 2022-04-28 NOTE — ED PROVIDER NOTE - CLINICAL SUMMARY MEDICAL DECISION MAKING FREE TEXT BOX
Patient here with generalized complaints  appears well   exam unremarkable   Patient asking for food.

## 2022-04-28 NOTE — ED PROVIDER NOTE - OBJECTIVE STATEMENT
36 y/o male poor historian, poor hygiene now here with no obvious medical complaints. The patient is asking for peanut butter sandwich and tylenol, appreciate triage not but patient does not have medical complaints.

## 2022-04-28 NOTE — ED ADULT NURSE NOTE - NSIMPLEMENTINTERV_GEN_ALL_ED
Implemented All Universal Safety Interventions:  [place] to call system. Call bell, personal items and telephone within reach. Instruct patient to call for assistance. Room bathroom lighting operational. Non-slip footwear when patient is off stretcher. Physically safe environment: no spills, clutter or unnecessary equipment. Stretcher in lowest position, wheels locked, appropriate side rails in place.

## 2022-04-28 NOTE — ED PROVIDER NOTE - PATIENT PORTAL LINK FT
You can access the FollowMyHealth Patient Portal offered by [facility] by registering at the following website: [email or web address]. By joining [employer]’s FollowMyHealth portal, you will also be able to view your health information using other applications (apps) compatible with our system.

## 2022-04-28 NOTE — ED ADULT TRIAGE NOTE - CHIEF COMPLAINT QUOTE
reports right hand pain- unable to report mechanism of injury- is eating a donut and peanut butter in triage

## 2022-04-30 ENCOUNTER — EMERGENCY (EMERGENCY)
Facility: HOSPITAL | Age: 37
LOS: 1 days | Discharge: HOME | End: 2022-04-30
Admitting: EMERGENCY MEDICINE
Payer: SELF-PAY

## 2022-04-30 VITALS
RESPIRATION RATE: 16 BRPM | HEART RATE: 82 BPM | TEMPERATURE: 97 F | OXYGEN SATURATION: 97 % | DIASTOLIC BLOOD PRESSURE: 70 MMHG | SYSTOLIC BLOOD PRESSURE: 150 MMHG

## 2022-04-30 DIAGNOSIS — Z00.00 ENCOUNTER FOR GENERAL ADULT MEDICAL EXAMINATION WITHOUT ABNORMAL FINDINGS: ICD-10-CM

## 2022-04-30 DIAGNOSIS — R10.9 UNSPECIFIED ABDOMINAL PAIN: ICD-10-CM

## 2022-04-30 DIAGNOSIS — R11.0 NAUSEA: ICD-10-CM

## 2022-04-30 DIAGNOSIS — R14.0 ABDOMINAL DISTENSION (GASEOUS): ICD-10-CM

## 2022-04-30 PROCEDURE — 99283 EMERGENCY DEPT VISIT LOW MDM: CPT

## 2022-04-30 PROCEDURE — 99053 MED SERV 10PM-8AM 24 HR FAC: CPT

## 2022-04-30 RX ORDER — HYDROXYZINE HCL 10 MG
25 TABLET ORAL ONCE
Refills: 0 | Status: COMPLETED | OUTPATIENT
Start: 2022-04-30 | End: 2022-04-30

## 2022-04-30 RX ORDER — ONDANSETRON 8 MG/1
4 TABLET, FILM COATED ORAL ONCE
Refills: 0 | Status: COMPLETED | OUTPATIENT
Start: 2022-04-30 | End: 2022-04-30

## 2022-04-30 RX ORDER — FAMOTIDINE 10 MG/ML
20 INJECTION INTRAVENOUS ONCE
Refills: 0 | Status: COMPLETED | OUTPATIENT
Start: 2022-04-30 | End: 2022-04-30

## 2022-04-30 RX ADMIN — Medication 25 MILLIGRAM(S): at 02:32

## 2022-04-30 RX ADMIN — Medication 30 MILLILITER(S): at 02:32

## 2022-04-30 RX ADMIN — ONDANSETRON 4 MILLIGRAM(S): 8 TABLET, FILM COATED ORAL at 02:32

## 2022-04-30 RX ADMIN — FAMOTIDINE 20 MILLIGRAM(S): 10 INJECTION INTRAVENOUS at 02:32

## 2022-04-30 NOTE — ED PROVIDER NOTE - NSFOLLOWUPINSTRUCTIONS_ED_ALL_ED_FT
Follow up with your primary care doctor or clinics listed below if you do not have a doctor,    [address] [address]  To make an appointment, call [phone]    [facility]  Address: [facility] [address] [place] [id number]  Appointment Center: [phone] ([phone])     [facility] is a good referral line for crisis and substance abuse help.  [facility] has drop in programs all over the city.    Return to the ER for Emergencies.  Return immediately for any new or worsening symptoms or any new concerns

## 2022-04-30 NOTE — ED PROVIDER NOTE - OBJECTIVE STATEMENT
38 yo M with PMHx of 38 yo M with no known PMHx, undomiciled, has multiple bands from other hospital visits earlier today, presenting c/o itching and abdominal bloating with nausea after eating some street food.  Reports having abdominal cramps and bloating sensation with nausea and itching. Denies fever, chills, V/D/C, melena, hematochezia, hematuria, change in urinary/bowel function, dysuria, flank pain, HA, dizziness, rash, insect bites, malaise, and focal weakness

## 2022-04-30 NOTE — ED PROVIDER NOTE - PHYSICAL EXAMINATION
Gen - Unkempt M, NAD, comfortable and non-toxic appearing  Skin - warm, dry, poor hygiene, no streaking, warmth, fluctuance, desquamation of the skin   HEENT - AT/NC, airway patent, neck supple and FROM  CV - S1S2, R/R/R  Resp - respiration non labored, speaking in full sentences   GI - soft, ND, NT, no CVAT b/l   MS - No acute or gross deformities noted to extremities. No midline spinal tenderness or step off on palpation  Neuro - AxOx3, ambulatory without gait disturbance

## 2022-04-30 NOTE — ED PROVIDER NOTE - CLINICAL SUMMARY MEDICAL DECISION MAKING FREE TEXT BOX
medical screening exam has been performed.  Pt with no acute trauma or emergencies noted and exam wnl.  hemodynamically stable and non toxic appearing, offered supportive care and food, no s/s of infestation or bacterial infection on exam, medically stable for dc. f/u instructions have been provided medical screening exam has been performed.  Pt reports few hours of abd bloating/cramps/nausea after ingestion of street food and some chronic itching, abd soft, AFVSS, offered oral meds and tolerated po here without N/V, medically stable for dc and f/u information provided

## 2022-04-30 NOTE — ED PROVIDER NOTE - PATIENT PORTAL LINK FT
You can access the FollowMyHealth Patient Portal offered by [facility] by registering at the following website: [email or web address]. By joining [employer]’s FollowMyHealth portal, you will also be able to view your health information using other applications (apps) compatible with our system.

## 2022-07-01 ENCOUNTER — EMERGENCY (EMERGENCY)
Facility: HOSPITAL | Age: 37
LOS: 1 days | Discharge: LEFT BEFORE DISCHARGE | End: 2022-07-01
Attending: EMERGENCY MEDICINE | Admitting: EMERGENCY MEDICINE

## 2022-07-01 VITALS
WEIGHT: 195.11 LBS | DIASTOLIC BLOOD PRESSURE: 79 MMHG | OXYGEN SATURATION: 98 % | HEART RATE: 90 BPM | TEMPERATURE: 99 F | HEIGHT: 74 IN | RESPIRATION RATE: 18 BRPM | SYSTOLIC BLOOD PRESSURE: 125 MMHG

## 2022-07-01 DIAGNOSIS — M79.641 PAIN IN RIGHT HAND: ICD-10-CM

## 2022-07-01 DIAGNOSIS — Z53.29 PROCEDURE AND TREATMENT NOT CARRIED OUT BECAUSE OF PATIENT'S DECISION FOR OTHER REASONS: ICD-10-CM

## 2022-07-01 PROCEDURE — 99282 EMERGENCY DEPT VISIT SF MDM: CPT

## 2022-07-01 PROCEDURE — 99053 MED SERV 10PM-8AM 24 HR FAC: CPT

## 2022-07-01 RX ORDER — IBUPROFEN 200 MG
600 TABLET ORAL ONCE
Refills: 0 | Status: DISCONTINUED | OUTPATIENT
Start: 2022-07-01 | End: 2022-07-04

## 2022-07-01 NOTE — ED ADULT NURSE NOTE - OBJECTIVE STATEMENT
pt states " I don't feel good." reports his stomach hurts and he used k2 earlier today    pt. laying in bed resting, nad, resps. even/unlabored, triage note as above

## 2022-07-01 NOTE — ED ADULT NURSE REASSESSMENT NOTE - NS ED NURSE REASSESS COMMENT FT1
pt. walked out of ed, after walking into another pt. room, and being reminded to remain in his bed, md notified, security verified facility exit. without incident, steady gait

## 2022-07-01 NOTE — ED PROVIDER NOTE - CLINICAL SUMMARY MEDICAL DECISION MAKING FREE TEXT BOX
reports right hand pain. history changing from different providers. plan xray and motrin however eloped prior to obtaining

## 2022-07-01 NOTE — ED PROVIDER NOTE - MUSCULOSKELETAL, MLM
Spine appears normal, range of motion is not limited, right hand with well healed scar. no redness/warmth/swelling. normal rom of finger. reports some pain over dorsum.

## 2022-07-01 NOTE — ED ADULT NURSE REASSESSMENT NOTE - NS ED NURSE REASSESS COMMENT FT1
pt. missing from bed, located per security, who found pt. wandering about hospital, escorted back to bed

## 2022-10-06 NOTE — ED PROVIDER NOTE - RESPIRATORY, MLM
Breath sounds clear and equal bilaterally. Elliptical Excision Additional Text (Leave Blank If You Do Not Want): The margin was drawn around the clinically apparent lesion.  An elliptical shape was then drawn on the skin incorporating the lesion and margins.  Incisions were then made along these lines to the appropriate tissue plane and the lesion was extirpated.

## 2024-05-26 NOTE — ED ADULT NURSE NOTE - CHIEF COMPLAINT QUOTE
Procedure: Device Interrogation Including analysis of device parameters  Current Settings: Ventricular Assist Device  Review of device function is stable/unstable stable  Anticoagulation w/ coumadin. Goal INR 2-3. S/p 1mg Vit K on 5/21. Currenly therapeutic.         5/26/2024     8:13 AM 5/26/2024     4:30 AM 5/25/2024    11:00 PM 5/25/2024     8:58 PM 5/25/2024     4:47 PM 5/25/2024    12:22 PM 5/25/2024     8:29 AM   TXP LVAD INTERROGATIONS   Type HeartMate3 HeartMate3 HeartMate3 HeartMate3 HeartMate3 HeartMate3 HeartMate3   Flow 4.3 4.4 4 4.5 4.5 4.4 4.1   Speed 5100 5150 5100 5100 5100 5100 5100   PI 5.1 4.4 5.6 4.2 4.2 4.9 5.4   Power (Serna) 3.6 3.6 3.6 3.6 3.7 3.7 3.6   LSL 4700     4700 4700   Pulsatility  Intermittent pulse Intermittent pulse Intermittent pulse           Pt. walk stating that he had a seizure 4 hours ago while cooking. Reports waking up on the floor and pain to posterior head and upper back. States that he has not taken his trileptal for about 2 weeks.

## 2024-07-30 NOTE — ED ADULT TRIAGE NOTE - BP NONINVASIVE DIASTOLIC (MM HG)
Take antibiotics as directed  Keep feet clean and dry  Please follow up with Podiatry as soon as possible   79